# Patient Record
(demographics unavailable — no encounter records)

---

## 2019-09-16 NOTE — MM
Reason for exam: screening  (asymptomatic).

Last mammogram was performed 1 year and 10 months ago.



Physical Findings:

A clinical breast exam by your physician is recommended on an annual basis and 

results should be correlated with mammographic findings.



MG Screening Mammo w CAD

Bilateral CC and MLO view(s) were taken.

Prior study comparison: October 31, 2017, mammogram, performed at [facility].

The breast tissue is heterogeneously dense. This may lower the sensitivity of 

mammography.  There is no discrete abnormality.





ASSESSMENT: Negative, BI-RAD 1



RECOMMENDATION:

Routine screening mammogram of both breasts in 1 year.